# Patient Record
Sex: MALE | Race: OTHER | HISPANIC OR LATINO | URBAN - METROPOLITAN AREA
[De-identification: names, ages, dates, MRNs, and addresses within clinical notes are randomized per-mention and may not be internally consistent; named-entity substitution may affect disease eponyms.]

---

## 2018-07-14 ENCOUNTER — EMERGENCY (EMERGENCY)
Facility: HOSPITAL | Age: 23
LOS: 0 days | Discharge: HOME | End: 2018-07-14
Attending: EMERGENCY MEDICINE
Payer: MEDICAID

## 2018-07-14 VITALS
RESPIRATION RATE: 18 BRPM | DIASTOLIC BLOOD PRESSURE: 59 MMHG | TEMPERATURE: 99 F | HEART RATE: 76 BPM | OXYGEN SATURATION: 99 % | WEIGHT: 179.02 LBS | SYSTOLIC BLOOD PRESSURE: 120 MMHG | HEIGHT: 69 IN

## 2018-07-14 DIAGNOSIS — H60.332 SWIMMER'S EAR, LEFT EAR: ICD-10-CM

## 2018-07-14 DIAGNOSIS — H92.02 OTALGIA, LEFT EAR: ICD-10-CM

## 2018-07-14 DIAGNOSIS — H61.22 IMPACTED CERUMEN, LEFT EAR: ICD-10-CM

## 2018-07-14 PROCEDURE — 99283 EMERGENCY DEPT VISIT LOW MDM: CPT | Mod: 25

## 2018-07-14 PROCEDURE — 69209 REMOVE IMPACTED EAR WAX UNI: CPT

## 2018-07-14 RX ORDER — CIPROFLOXACIN 6 MG/ML
1 SUSPENSION INTRATYMPANIC
Qty: 1 | Refills: 0 | OUTPATIENT
Start: 2018-07-14 | End: 2018-07-20

## 2018-07-14 RX ORDER — AMOXICILLIN 250 MG/5ML
1 SUSPENSION, RECONSTITUTED, ORAL (ML) ORAL
Qty: 20 | Refills: 0 | OUTPATIENT
Start: 2018-07-14 | End: 2018-07-23

## 2018-07-14 RX ORDER — IBUPROFEN 200 MG
600 TABLET ORAL ONCE
Qty: 0 | Refills: 0 | Status: COMPLETED | OUTPATIENT
Start: 2018-07-14 | End: 2018-07-14

## 2018-07-14 RX ADMIN — Medication 600 MILLIGRAM(S): at 15:58

## 2018-07-14 NOTE — ED PROVIDER NOTE - CARE PLAN
Principal Discharge DX:	Impacted cerumen of left ear Principal Discharge DX:	Impacted cerumen of left ear  Secondary Diagnosis:	Acute swimmer's ear of left side

## 2018-07-14 NOTE — ED ADULT NURSE NOTE - CAS EDN DISCHARGE ASSESSMENT
Alert and oriented to person, place and time/Dressing clean and dry/Awake/No adverse reaction to first time med in ED

## 2018-07-14 NOTE — ED PROVIDER NOTE - PHYSICAL EXAMINATION
Vitals:   Gen: AAOx3, NAD, sitting comfortably in stretcher  Head: ncat, perrla, eomi b/l  ENT: R TM normal, L TM normal, external ear on L is partially maceration, small discharge after cerumen removal (cerumen initially impacted in L ear canal)  Neck: supple, no lymphadenopathy, no midline deviation  Heart: rrr, no m/r/g  Lungs: CTA b/l, no rales/ronchi/wheezes  Abd: soft, nontender, non-distended, no rebound or guarding  Ext: no clubbing/cyanosis/edema  Neuro: sensation and muscle strength intact b/l, steady gait

## 2018-07-14 NOTE — ED PROVIDER NOTE - OBJECTIVE STATEMENT
21 yo M with L ear pain for 5 days, he can't hear well out of it.  No inciting event.  No water stuck in ear, no discharge.  It hurts in the left ear, worse with chewing.  No other complaints or inciting event, no trauma.  ROS: negative for fever, cough, headache, chest pain, shortness of breath, abd pain, nausea, vomiting, diarrhea, rash, paresthesia, and weakness--all other systems reviewed are negative.   PMH: negative; Meds: Denies; SH: Denies smoking/drinking/drug use

## 2018-07-14 NOTE — ED PROVIDER NOTE - MEDICAL DECISION MAKING DETAILS
21 yo M with impacted cerumen of L ear  -cerumen disimpaction, reeval ear, pain control motrin  -d/c with ENT f/u 23 yo M with impacted cerumen of L ear, possible external otitis  -cerumen disimpaction, reeval ear, pain control motrin  -d/c with ENT f/u

## 2018-07-14 NOTE — ED PROCEDURE NOTE - PROCEDURE ADDITIONAL DETAILS
plastic angiocath tip used to irrigate ear canal with 50/50 saline/peroxide mixture, all wax removed without issue, TM intact

## 2018-07-14 NOTE — ED PROVIDER NOTE - PROGRESS NOTE DETAILS
cerumen disimpacted, normal TM b/l, no signs of infection  Pt. reports feeling better after meds  pt. agrees to f/u with primary care outpt.  pt. understands to return to ED if symptoms worsen; will d/c

## 2022-10-17 PROBLEM — Z00.00 ENCOUNTER FOR PREVENTIVE HEALTH EXAMINATION: Status: ACTIVE | Noted: 2022-10-17

## 2022-11-03 ENCOUNTER — APPOINTMENT (OUTPATIENT)
Dept: ORTHOPEDIC SURGERY | Facility: CLINIC | Age: 27
End: 2022-11-03

## 2022-11-03 DIAGNOSIS — M54.16 RADICULOPATHY, LUMBAR REGION: ICD-10-CM

## 2022-11-03 PROCEDURE — 99204 OFFICE O/P NEW MOD 45 MIN: CPT

## 2022-11-03 PROCEDURE — 72170 X-RAY EXAM OF PELVIS: CPT

## 2022-11-03 PROCEDURE — 72110 X-RAY EXAM L-2 SPINE 4/>VWS: CPT

## 2022-11-03 RX ORDER — NAPROXEN 500 MG/1
500 TABLET ORAL
Qty: 60 | Refills: 0 | Status: ACTIVE | COMMUNITY
Start: 2022-11-03 | End: 1900-01-01

## 2022-11-03 RX ORDER — METHYLPREDNISOLONE 4 MG/1
4 TABLET ORAL
Qty: 1 | Refills: 0 | Status: ACTIVE | COMMUNITY
Start: 2022-11-03 | End: 1900-01-01

## 2022-11-03 RX ORDER — GABAPENTIN 100 MG/1
100 CAPSULE ORAL
Qty: 60 | Refills: 2 | Status: ACTIVE | COMMUNITY
Start: 2022-11-03 | End: 1900-01-01

## 2022-11-03 NOTE — HISTORY OF PRESENT ILLNESS
[Lower back] : lower back [Gradual] : gradual [Dull/Aching] : dull/aching [Radiating] : radiating [Tingling] : tingling [Intermittent] : intermittent [Nothing helps with pain getting better] : Nothing helps with pain getting better [Walking] : walking [Bending forward] : bending forward [de-identified] : 11/3/22- AP is a 27 year old male here today for lower back pain. onset of pain approx. one year ago, NKI. pt went to PT and chiropractor, no relief. pt states he is having pain down LLE from lower back posterior thigh to ankle. No N/T. No bb dysfunction. PCP Rx diclofenac w/o relief.  [] : no [FreeTextEntry7] : lower back to LLE [de-identified] : 2021 [de-identified] : outside provider

## 2022-11-03 NOTE — ASSESSMENT
[FreeTextEntry1] : Patient has failed 6 weeks of conservative care, including physical therapy and nsaids. Will obtain lumbar MRI to rule out HNP; will also be used to guide potential future injections/surgical management. \par Gabapentin- Patient advised of sedating effects, instructed not to drive, operate machinery, or take with other sedating medications. Advised of need to taper on/off medication and risk of abruptly stopping gabapentin.\par MDP\par NSAIDs- Patient warned of risk of medication to GI tract, increased blood pressure, cardiac risk, and risk of fluid retention.  Advised to clear medication with internist or PCP if any concurrent health problem with heart, blood pressure, or GI system exists.

## 2022-11-03 NOTE — IMAGING
[de-identified] : LSPINE\par Palpation:L paraspinal TTP\par ROM: Flexion with pain\par Strength: 5/5 bilateral hip flexors, knee extensors, ankle dorsiflexors, EHL, ankle plantarflexors\par Sensation: Sensation present to light touch bilateral L2-S1 distributions\par Provocative maneuvers: + L  straight leg raise \par \par Bilateral hips-\par Palpation: No tenderness to palpation over greater trochanter or IT band\par ROM: No pain with flexion and internal rotation  [Straightening consistent with spasm] : Straightening consistent with spasm [Disc space narrowing] : Disc space narrowing [AP] : anteroposterior [There are no fractures, subluxations or dislocations. No significant abnormalities are seen] : There are no fractures, subluxations or dislocations. No significant abnormalities are seen

## 2022-11-09 ENCOUNTER — APPOINTMENT (OUTPATIENT)
Dept: MRI IMAGING | Facility: CLINIC | Age: 27
End: 2022-11-09

## 2022-11-30 ENCOUNTER — APPOINTMENT (OUTPATIENT)
Dept: ORTHOPEDIC SURGERY | Facility: CLINIC | Age: 27
End: 2022-11-30

## 2023-02-23 ENCOUNTER — NON-APPOINTMENT (OUTPATIENT)
Age: 28
End: 2023-02-23

## 2024-02-27 NOTE — ED PROCEDURE NOTE - NS ED PROCEDURE ASSISTED BY
The procedure was performed independently
[facility]  Orthpedic  [address], [place]   Phone: [phone]  Fax:   Follow Up Time: 7-10 Days

## 2024-05-19 ENCOUNTER — NON-APPOINTMENT (OUTPATIENT)
Age: 29
End: 2024-05-19